# Patient Record
Sex: FEMALE | Race: BLACK OR AFRICAN AMERICAN | NOT HISPANIC OR LATINO | URBAN - METROPOLITAN AREA
[De-identification: names, ages, dates, MRNs, and addresses within clinical notes are randomized per-mention and may not be internally consistent; named-entity substitution may affect disease eponyms.]

---

## 2018-04-23 PROBLEM — 52506002 CHRONIC ULCERATIVE RECTOSIGMOIDITIS: Status: ACTIVE | Noted: 2017-04-13

## 2018-09-25 PROBLEM — 196735001 CSG - CHRONIC SUPERFICIAL GASTRITIS: Status: ACTIVE | Noted: 2018-05-23

## 2018-10-30 PROBLEM — 442076002: Status: ACTIVE | Noted: 2018-10-30

## 2019-05-05 PROBLEM — 444548001: Status: ACTIVE | Noted: 2019-05-05

## 2020-01-07 PROBLEM — 92343006 BENIGN NEOPLASM OF SIGMOID COLON: Status: ACTIVE | Noted: 2019-10-02

## 2020-01-07 PROBLEM — 16331000 HEARTBURN: Status: ACTIVE | Noted: 2019-11-14

## 2020-01-07 PROBLEM — 57676002 JOINT PAIN: Status: ACTIVE | Noted: 2019-11-14

## 2020-01-07 PROBLEM — 274774002: Status: ACTIVE | Noted: 2019-08-14

## 2020-06-09 ENCOUNTER — TELEPHONE ENCOUNTER (OUTPATIENT)
Dept: URBAN - METROPOLITAN AREA CLINIC 35 | Facility: CLINIC | Age: 60
End: 2020-06-09

## 2020-06-10 ENCOUNTER — OFFICE VISIT (OUTPATIENT)
Dept: URBAN - METROPOLITAN AREA CLINIC 35 | Facility: CLINIC | Age: 60
End: 2020-06-10

## 2020-06-10 VITALS — TEMPERATURE: 97.7 F | WEIGHT: 118 LBS | HEIGHT: 65 IN | BODY MASS INDEX: 19.66 KG/M2

## 2020-06-10 RX ORDER — LIOTHYRONINE SODIUM 5 UG/1
TK 1 T PO QD TABLET ORAL
Qty: 30 UNSPECIFIED | Refills: 0 | Status: ACTIVE | COMMUNITY

## 2020-06-10 RX ORDER — MESALAMINE 4 G/60ML
1 SUPPOSITORY SUSPENSION RECTAL DAILY
Qty: 30 | Status: DISCONTINUED | COMMUNITY
Start: 2019-04-18

## 2020-06-10 RX ORDER — METHOTREXATE SODIUM 2.5 MG/1
5 PILLS TABLET ORAL
Status: DISCONTINUED | COMMUNITY
Start: 2019-08-27

## 2020-06-10 RX ORDER — LEVOTHYROXINE SODIUM 75 UG/1
1 CAPSULE CAPSULE ORAL ONCE A DAY
Status: ON HOLD | COMMUNITY

## 2020-06-10 RX ORDER — INFLIXIMAB 100 MG/10ML
5 MG/KG INJECTION, POWDER, LYOPHILIZED, FOR SOLUTION INTRAVENOUS
Status: DISCONTINUED | COMMUNITY
Start: 2019-12-11

## 2020-06-10 RX ORDER — OMEPRAZOLE 20 MG/1
1 CAPSULE 30 MINUTES BEFORE MORNING MEAL CAPSULE, DELAYED RELEASE ORAL ONCE A DAY
Qty: 90 | Refills: 1 | Status: DISCONTINUED | COMMUNITY
Start: 2019-11-06

## 2020-06-10 RX ORDER — FOLIC ACID 1 MG/1
1 TABLET TABLET ORAL ONCE A DAY
Status: DISCONTINUED | COMMUNITY
Start: 2019-08-28

## 2020-06-10 RX ORDER — MESALAMINE 1000 MG/1
1 SUPPOSITORY AT BEDTIME SUPPOSITORY RECTAL ONCE A DAY
Qty: 30 | Refills: 1 | Status: DISCONTINUED | COMMUNITY
Start: 2019-04-18

## 2020-06-10 RX ORDER — CELECOXIB 100 MG/1
1 CAPSULE WITH FOOD CAPSULE ORAL TWICE A DAY
Qty: 60 CAPSULE | Refills: 0 | Status: DISCONTINUED | COMMUNITY
Start: 2019-11-14

## 2020-06-10 RX ORDER — PREDNISONE 10 MG/1
AS DIRECTED TABLET ORAL TWICE A DAY
Qty: 30 | Refills: 1 | Status: DISCONTINUED | COMMUNITY
Start: 2019-08-22

## 2020-06-10 RX ORDER — LEVOTHYROXINE SODIUM 0.07 MG/1
TK 1 T PO QD TABLET ORAL
Qty: 30 UNSPECIFIED | Refills: 5 | Status: ACTIVE | COMMUNITY

## 2020-06-10 RX ORDER — FAMOTIDINE 40 MG/1
1 TABLET AT BEDTIME TABLET, FILM COATED ORAL ONCE A DAY
Qty: 30 | Refills: 2 | Status: DISCONTINUED | COMMUNITY
Start: 2019-11-14

## 2020-06-10 RX ORDER — ONDANSETRON HYDROCHLORIDE 4 MG/1
1 TABLET TABLET, FILM COATED ORAL
Qty: 30 | Refills: 1 | Status: DISCONTINUED | COMMUNITY
Start: 2019-11-06

## 2020-06-10 NOTE — HPI-MIGRATED HPI
;   ;   ;   ;   ;     Bloating/Gas : She deny abnormal episodes of bloating and gas since last visit.   Last visit (2020) Patient state after she weaned off the Prednisone on 10/15/2019 she began to have increase gas and loud bowel sound. Subsequently, she was advised to take Omeprazole 20 mg 1 QD.   She deny bloating/gas since she changed to Methotrexate injections.     Last visit (10/01/2019) Admit less frequent episodes of bloating and gas.   Last visit (2019) She continue to admit increase episodes of gas and bloating throughout the day since she has been on Rowasa.      Last visit (2019) She continue to admit increase episodes of gas throughout the day.    2019 Patient admits increase episodes of gas throughout the day.   2019 Admit increase gas symptoms since she has been taking Lialda.  Admit relief with flatus.;   Unintentional weight loss/weight gain : She deny abnormal weight loss since last visit.  Last visit (2020) She admit loosing 10 lbs over the course of 2 months, which she attribute to decreased appetite due to increased nausea symptoms since she weaned off Prednisone on (10/15/2019).    While inpatient at [facility] from -2019 she was diagnosed with  Severe malnutrition, seen by dietitian. Fiber-restricted.   She report improvement in appetite since she restarted Predisone on 2019.   Today she weigh 122 lbs.  Last visit (10/01/2019) Weight has been stable since last visit.  Today she weigh 126 lbs.  Last visit (2019) Continue to loose weight.  She eat three small gluten free meals daily   Visit (2019) Patient admits unintentional weight loss. She is not weighing herself, as of today per our records there is a 7 pound weight loss since 2019.;   Nausea : Patient admit episodes that occur periodically, which she attribute to increase stress due to the passing of her Mother 5 days ago.  Mother  from complications from Small Cell Lung Cancer.      Last visit (20200 Patient state after she weaned off the Prednisone on 10/15/2019 she began to have increase nausea with feeling "upset stomach", gas and loud bowel sound. she was advised to take Omeprazole 20 mg 1 QD and Zofran TID prn, which she only took the Omeprazole.  She denies symptoms since she has been changed to Methotrexate injection.    Last visit (10/01/2019) Admit nausea that occur a couple time a month.  Last visit (2019) Admit nausea since she has been on the rowasa.    Last visit (2019) She denies any episodes of nausea.  2019 Patient admits intermittent episodes of nausea.   2019 Admit nausea symptoms that occur shortly after taking Lialda and will last for hours then dissipate.  She has not taken medication for symptoms  ;   Ulcerative Colitis : Patient presents today for a follow up for Ulcerative Colitis.   Patient had a Robiotic Total Abdominal Colectomy with End Ileostomy performed by Dr. Arnol Wallace at [facility] on (2020).  Following first post op on (3/3/2020) she began to wean off Prednisone by 5 mg per week.  She was on 20 mg per day at that time and is now completely weaned off.   She is feeling well.  Post-op follow up with TRAY Green at [facility] on (3/3/2020) noting patient appear to be healing appropriately without acute post op complications. Will plan for a robotic prostectomy with j pouch creation and DLI. Patient tony remain on 5 mg of prednisone throughout her surgery.    Patient currently admit changing her colostomy bag 7-9 times per day.  Deneis blood, mucus, or melena in stools.    Last visit (2020) Patient opted for surgery due to refractory Ulcerative colitis.  She will follow up after surgery     Last visit (2020) Patient presents today as a follow up  of ulcerative colitis.  Patient state she weaned off the Prednisone on 10/15/2019.  Since then she has been having increase nausea with feeling "upset stomach", gas and loud bowel sound. Has noticed mild swelling particular on her ring finger and when she take her shoes off.  She is having 2 soft formed BM's per day. Stools are typically really soft and formed or semi-formed and occasionally strenuous.   Currently admit 2 bowel movements per day with blood and mucus present with each evacuation. Admit associated rectal bleeding and lower abdominal cramping that occur prior to and during bowel movement, then will subside.  She increased Prednisone to 2 QAM, 1 QHS 2 days after d/c from [facility] (2019). Last Remicade infusion on (2019), next is scheduled for (2020).  Methotrexate injection every Tuesday.    She d/c the Budesonide on (2019) after admitted to [facility].     Last visit (10/01/2019) She admit marked improvement of symptoms since last visit.  Currently admit 1-2 normal and formed bowel movements per day.  Denies melena, blood or mucus in stools. She continues on the prednisone taper and is currently on prednisone 10 mg po QD   She began treatment with Remicade infusion on (2019)-0, 2 and 6 weeks  (  )   On 2019 we advised patient to start Methotrexate 2.5 mg 5 pills once weekly on an empty stomach, may cause nausea-can eat if not able to tolerate on an empty stomach.  Also need to take Folic Acid 1 mg 1 QD.  Current treatment consist of Remicade Infusion Q 8 weeks, last treatment on (2019) and next will be on 2019.  Methotrexate 2.5 mg 5 pills once every Tuesday on a week on an empty stomach.  She continue to taper the prednisone by 5 mg per week and her last dose will be Nov. 15, 2019.,   She has been well despite her Mother being diagnosed with Small Cell Lung Cancer.  Treated with Chemo and scheduled to have Radiation/Chemo 2019.   Last visit (2019) Patient presents today for a follow up of ulcerative colitis.  She has been treated with Prednisone 40 mg QD from Oct. 30, 2018 through (7/3/2019) and increased to 60 mg of prednisone 2019. Several attempts to taper Prednisone dose since (2019) with no avail, due to persistent flare up of symptoms.   She began treatment with Entyvio 2019 Q 8 weeks.  She was increased to Entyvio Q 6 weeks on (2019), (2019) was the first of the Q 6 week intervals.  Then increased Entyvio Q4 weeks with first 4 week interval on (2019).   She has been tapering the Prednisone 60 mg QD by 5 mg per week and currently at Prednisone 55 mg QD, using Rowasa BID and Turmeric QD.  She has been experiencing rectal irritation internal and external since she has been using the Rowasa.    Patient denies episodes of blood in stool, rectal bleeding or abdominal pain/cramping and her stools are formed since (2019).   Currently admit 1-2 normal and formed bowel movements per day.  Denies melena, blood or mucus in stools.    Last visit (2019)  She completed the use of Rowasa Kit, 4 GM, 1, QD and Canasa Suppository, 1000 MG, 1 QHS.   Her last dose of Entyvio was May 14, 2019 Patient contacted our office on (19) stating she continues to have 2-3 bowel movements a day, stools are runny, and soft.  She continues to take the IB guard.  She is worried that it's taking so long to calm completely down.  She is afraid to leave the house due to random loose stools with bleeding.  She want to know if she need to take a week or so of Prednisone for additional help?  As a result she was advised to stop Entocort and start Prednisone 20 mg po BID, 120-1 . Follow up 2 weeks   She figured out her recent flare up of symptoms was due to te use of Glutamine 1000 mg QD, which she has been taking for several months and d/c 3 weeks ago with resolution of symptoms.    She continue to take Turmeric QD, Prednisone 20 mg po BID and will decrease by 5 mg on Thursday.  Last IV Entyvio 300 MG was on (2019) and next is scheduled for 2019.     Currently admit 1-2 normal and formed bowel movements per day.  Denies melena, blood or mucus in stools.     Last visit (2019) Patient presents today as a follow up of ulcerative colitis and to review recent QDX results.  Patient admits her next dose of Entivyo is May 15, 2019. Her last episode of spotting was last week, she admits 2 bowel movements a day, stool is normal. Patient denies melena, blood, or mucus.   Patient contacted our office on 2019 stating her son was in the hospital x 1.5 weeks causing her to have an "over load of stress". She experienced spots of rectal bleeding within the toilet, abdominal pain and soft stools.  As A result she was prescribed Uceris 9 mg po qd 60 -1.  Patient then contacted our office on 2019 stating she is not getting the relief she thought she would have by week 3 with the budesonide.  She continue to admit a small amount of blood in stools daily and on the tissue with rectal soreness. In addition, she is having 2-3 bowel movements a day, stools are runny, and soft.  She continues to take the IB guard. We advised her to  stop Entocort and start Prednisone 20 mg po BID, 120 and have a QDX completed.  2019 Patient presents today for a follow up of Ulcerative colitis.   She began treatment with Entyvio 2019 and second dose on 2019.  Admit feeling low energy the day following her infusions.  Also has been having joint stiffness in arms legs and sometimes in her fingers since beginning treatment.  Admit 1 bowel movement per day with formed stools. Occasionally will have strenuous defecations.  Of note, patient would like to discuss discontinuing the Lialda, due to continuous side effects of nausea and gas.        Last visit(2019)  Patient admits that she stopped taking Prednisone Tablet, 10 MG 2 weeks ago.   Patient admits continued use of Turmeric Curcumin Capsule, 5-1000 MG, as directed, Orally, Three times a day  Patient admits the Continued use of Lialda Delayed Release Tablet, 1.2, 2 tablets,  daily. She states 1 hour after she takes the medication she becomes nauseated and the symptoms last for 4 hours. Patient admits that she experience excessive gas after taking the medication as well.   1 bowel movement a day with stools normal in form.    18 Patient presents today as a follow up of Ulcerative Colitis. Patient was contacted (2018) with positive C Diff results from her QDx stool study.  Subsequently she was treated with Vancomycin 125 mg 1 QID x 10 days. On (2018) Patient states she completed the  Vancomycin 125 mg, then  Thursday and Friday she began to have a flare up.  Described as having 3 BM's per day with loose watery stools, episodes of rectal bleeding, and mild lower abdominal cramping.  Also having gas, some mucus and pink "flesh-like" debris within her stools.  She began taking 80 mg of Prednisone x 2 days.  She is currently taking Prednisone 20 mg BID.  Currently admit having 1 incomplete defecation since yesterday with narrow stool, but after increasing her water intake she was able to have normal and formed stool today.   Of note, Patient completed the gastric emptying study earlier today. She state she hasn't been able to research biologic's due to flare up of symptoms     Last visit (10/30/2018) Patient presents today for a consult for the evaluation of Ulcerative Colitis. She has previously been evaluated and treated by Dr. Lycnh. However, she presents today for a second opinion.  She states she first started having symptoms in 2017, at the time she was experiencing bloody diarrhea and abdominal pain. She was seen in our office by our physician assistant  2017 for change in bowel habit. A colonoscopy was scheduled for 2017 and revealed Ulcerative (chronic) Pancolitis throughout the rectum and her entire colon.   Patient started Lialda in 2017. However, she discontinued in 2017 and began taking Turmeric and followed an alpha glucosidase diet.   In March patient began to have episodes of dyspepsia. As a result she had an EGD completed by Dr. Lynch on 2018 revealing chronic active gastritis.  On  patient started to experience episodes of early satiety and was evaluated at [facility] ER. During that time her medication regimen included Lialda, Budesonide and a Prednisone taper (which she completed).  Patient admits symptoms have been controlled taking  Budesonide 3 MG PO TID, Lialda Tablet Delayed Release, 1.2 GM, 4 tablets PO QD. However, after having breast augmentation surgery completed 10/01/2018 she started to experience four bloody loose/watery stools per day. As a result she started herself on a Prednisone taper beginning with 60 mg po qd decreasing by 10 mg per week. She is currently on 40 mg po qd and continues to take  Budesonide 3 MG PO TID, Lialda Tablet Delayed Release, 1.2 GM, 4 tablets PO QD. She is currently having one loose/soft bowel movement per day.   Of note, her last colonoscopy was completed 2017 and EGD 2018 both by Dr. Lynch.   2018 Patient presents today for a follow-up office visit from 2018. She is currently taking Lialda 1.2 mg, 3 tablets, daily for UC. She also continues to take Rabeprazole 20 mg daily. Patient states that she completed the course of Prednisone "two weeks ago". Patient states that she has been feeling like her symptoms are coming back. She is currently having diarrhea which occurs first thing in the morning followed by soft stool. She is having "at least" 3 bowel movements per day. Also since stopping the Prednisone, patient states that she is having blood in her stool. The amount of blood varies in quantity, but happens every day. Patient admits to seeing mucous in her stool "a few times a week". She feels as if her stomach is inflamed. Patient states that she is still having stomach cramps. Of note, patient is no longer drinking Ensure. Also of note, patient is having "breast surgery" on Monday.      ;   Hospital Follow Up : Hospitalized (2020) following Robiotic Total Abdominal Colectomy with End Ileostomy performed by Dr. Arnol Wallace at [facility].  Last visit (20200 Patient presented to [facility] ED on (2019)due to feeling dehydrated.  She has a history of ulcerative colitis, on Remicade, Methotrexate SQ.  She has been tapered off chronic prednisone back in mid October.  After that, she started experiencing abdominal pain and increased bowel movement.  She thought initially it was due to oral methotrexate upsetting her stomach, and she was experiencing abdominal pain and increased bowel movements.  She was recently noted to have low potassium by PCP, started on supplements, which exacerbated her symptoms, so she stopped.    She developed rectal bleeding 2 to 3 weeks prior, and was having 4-6 intermittent episodes od diarrhea per day, possibly triggered by stress.   The day before admission, she had 6 bloody bowel movements, so she was started back on oral prednisone on the day of admission morning.  However, she continued to have bloody diarrhea, so she came to the emergency room.  GI was consulted, and patient was admitted to hospitalist service for ulcerative colitis flare. Hemoglobin was monitored closely. Her hemoglobin was stable.  Her hemoglobin on admission was 14.5.  On the day of discharge, it was 11.8, possible hemoconcentration on admission.   Clostridium difficile, Stool cultures and stool for ova and parasites was negative.  Seen by Gastroenterology Dr. Liang Payton.  Received intravenous Solu-Medrol. Hemoglobin stable between 11-12.  Bleeding stable, still had mild traces of blood on wiping; however, hemoglobin was stable.  Labs:  Sodium 142, potassium 3.8, glucose 151, creatinine 0.63.  ALT 12, alkaline phosphatase 80, AST 17, lipase less than 3.  Lactic acid 2.9, trended down to 0.9.  Hemoglobin 11.8, hematocrit 35.8, WBC 6, platelets 227.  Urinalysis negative.  Stool cultures:  Heavy growth of normal enteric raquel.  Stool for WBC positive for lactoferrin.  Lactic acidosis:  Resolved secondary to dehydration.  She was feeling much better.  Abdominal pain resolved.  Diarrhea better.  She was stable for discharge, tolerating diet. Discharged on (2019) to continue Prednisone 40 mg daily until she sees her gastroenterologist on . She also receives Methotrexate SQ every Tuesday and next Remicade infusion due on .   Severe malnutrition, seen by dietitian. Fiber-restricted. Follow up with PCP Dr. Dominga Sutherland in 1 week.  ;

## 2020-06-24 ENCOUNTER — TELEPHONE ENCOUNTER (OUTPATIENT)
Dept: URBAN - METROPOLITAN AREA CLINIC 35 | Facility: CLINIC | Age: 60
End: 2020-06-24

## 2020-09-14 ENCOUNTER — TELEPHONE ENCOUNTER (OUTPATIENT)
Dept: URBAN - METROPOLITAN AREA CLINIC 35 | Facility: CLINIC | Age: 60
End: 2020-09-14

## 2020-09-15 ENCOUNTER — OFFICE VISIT (OUTPATIENT)
Dept: URBAN - METROPOLITAN AREA CLINIC 35 | Facility: CLINIC | Age: 60
End: 2020-09-15

## 2020-09-15 NOTE — HPI-MIGRATED HPI
;   ;   ;   ;   ;     Bloating/Gas : Patient admits/denies any episodes of bloating/gas since her last visit.    Last visit (6/10/2020) She deny abnormal episodes of bloating and gas since last visit.   Last visit (2020) Patient state after she weaned off the Prednisone on 10/15/2019 she began to have increase gas and loud bowel sound. Subsequently, she was advised to take Omeprazole 20 mg 1 QD.   She deny bloating/gas since she changed to Methotrexate injections.     Last visit (10/01/2019) Admit less frequent episodes of bloating and gas.   Last visit (2019) She continue to admit increase episodes of gas and bloating throughout the day since she has been on Rowasa.      Last visit (2019) She continue to admit increase episodes of gas throughout the day.    2019 Patient admits increase episodes of gas throughout the day.   2019 Admit increase gas symptoms since she has been taking Lialda.  Admit relief with flatus.;   Unintentional weight loss/weight gain : She admits/denies any unintentional weight loss/gain since her last visit.   Last visit (6/10/2020) She deny abnormal weight loss since last visit.  Last visit (2020) She admit loosing 10 lbs over the course of 2 months, which she attribute to decreased appetite due to increased nausea symptoms since she weaned off Prednisone on (10/15/2019).    While inpatient at [facility] from -2019 she was diagnosed with  Severe malnutrition, seen by dietitian. Fiber-restricted.   She report improvement in appetite since she restarted Predisone on 2019.   Today she weigh 122 lbs.  Last visit (10/01/2019) Weight has been stable since last visit.  Today she weigh 126 lbs.  Last visit (2019) Continue to loose weight.  She eat three small gluten free meals daily   Visit (2019) Patient admits unintentional weight loss. She is not weighing herself, as of today per our records there is a 7 pound weight loss since 2019.;   Nausea : She admits/denies continued episodes of nausea since her last visit.   Last visit (6/10/2020) Patient admit episodes that occur periodically, which she attribute to increase stress due to the passing of her Mother 5 days ago.  Mother  from complications from Small Cell Lung Cancer.      Last visit (20200 Patient state after she weaned off the Prednisone on 10/15/2019 she began to have increase nausea with feeling "upset stomach", gas and loud bowel sound. she was advised to take Omeprazole 20 mg 1 QD and Zofran TID prn, which she only took the Omeprazole.  She denies symptoms since she has been changed to Methotrexate injection.    Last visit (10/01/2019) Admit nausea that occur a couple time a month.  Last visit (2019) Admit nausea since she has been on the rowasa.    Last visit (2019) She denies any episodes of nausea.  2019 Patient admits intermittent episodes of nausea.   2019 Admit nausea symptoms that occur shortly after taking Lialda and will last for hours then dissipate.  She has not taken medication for symptoms  ;   Ulcerative Colitis : Patient presents today for a follow up of Ulcerative Colitis. She admits/denies any associated symptoms at this time.   Patient reports changing her colostomy bag --- times a day. She admits/denies any blood, mucus, or melena present.    Last visit (6/10/2020) Patient presents today for a follow up for Ulcerative Colitis.   Patient had a Robiotic Total Abdominal Colectomy with End Ileostomy performed by Dr. Arnol Wallace at [facility] on (2020).  Following first post op on (3/3/2020) she began to wean off Prednisone by 5 mg per week.  She was on 20 mg per day at that time and is now completely weaned off.   She is feeling well.  Post-op follow up with TRAY Green at [facility] on (3/3/2020) noting patient appear to be healing appropriately without acute post op complications. Will plan for a robotic prostectomy with j pouch creation and DLI. Patient tony remain on 5 mg of prednisone throughout her surgery.    Patient currently admit changing her colostomy bag 7-9 times per day.  Deneis blood, mucus, or melena in stools.    Last visit (2020) Patient opted for surgery due to refractory Ulcerative colitis.  She will follow up after surgery     Last visit (2020) Patient presents today as a follow up  of ulcerative colitis.  Patient state she weaned off the Prednisone on 10/15/2019.  Since then she has been having increase nausea with feeling "upset stomach", gas and loud bowel sound. Has noticed mild swelling particular on her ring finger and when she take her shoes off.  She is having 2 soft formed BM's per day. Stools are typically really soft and formed or semi-formed and occasionally strenuous.   Currently admit 2 bowel movements per day with blood and mucus present with each evacuation. Admit associated rectal bleeding and lower abdominal cramping that occur prior to and during bowel movement, then will subside.  She increased Prednisone to 2 QAM, 1 QHS 2 days after d/c from [facility] (2019). Last Remicade infusion on (2019), next is scheduled for (2020).  Methotrexate injection every Tuesday.    She d/c the Budesonide on (2019) after admitted to [facility].     Last visit (10/01/2019) She admit marked improvement of symptoms since last visit.  Currently admit 1-2 normal and formed bowel movements per day.  Denies melena, blood or mucus in stools. She continues on the prednisone taper and is currently on prednisone 10 mg po QD   She began treatment with Remicade infusion on (2019)-0, 2 and 6 weeks  (  )   On 2019 we advised patient to start Methotrexate 2.5 mg 5 pills once weekly on an empty stomach, may cause nausea-can eat if not able to tolerate on an empty stomach.  Also need to take Folic Acid 1 mg 1 QD.  Current treatment consist of Remicade Infusion Q 8 weeks, last treatment on (2019) and next will be on 2019.  Methotrexate 2.5 mg 5 pills once every Tuesday on a week on an empty stomach.  She continue to taper the prednisone by 5 mg per week and her last dose will be Nov. 15, 2019.,   She has been well despite her Mother being diagnosed with Small Cell Lung Cancer.  Treated with Chemo and scheduled to have Radiation/Chemo 2019.   Last visit (2019) Patient presents today for a follow up of ulcerative colitis.  She has been treated with Prednisone 40 mg QD from Oct. 30, 2018 through (7/3/2019) and increased to 60 mg of prednisone 2019. Several attempts to taper Prednisone dose since (2019) with no avail, due to persistent flare up of symptoms.   She began treatment with Entyvio 2019 Q 8 weeks.  She was increased to Entyvio Q 6 weeks on (2019), (2019) was the first of the Q 6 week intervals.  Then increased Entyvio Q4 weeks with first 4 week interval on (2019).   She has been tapering the Prednisone 60 mg QD by 5 mg per week and currently at Prednisone 55 mg QD, using Rowasa BID and Turmeric QD.  She has been experiencing rectal irritation internal and external since she has been using the Rowasa.    Patient denies episodes of blood in stool, rectal bleeding or abdominal pain/cramping and her stools are formed since (2019).   Currently admit 1-2 normal and formed bowel movements per day.  Denies melena, blood or mucus in stools.    Last visit (2019)  She completed the use of Rowasa Kit, 4 GM, 1, QD and Canasa Suppository, 1000 MG, 1 QHS.   Her last dose of Entyvio was May 14, 2019 Patient contacted our office on (19) stating she continues to have 2-3 bowel movements a day, stools are runny, and soft.  She continues to take the IB guard.  She is worried that it's taking so long to calm completely down.  She is afraid to leave the house due to random loose stools with bleeding.  She want to know if she need to take a week or so of Prednisone for additional help?  As a result she was advised to stop Entocort and start Prednisone 20 mg po BID, 120-1 . Follow up 2 weeks   She figured out her recent flare up of symptoms was due to te use of Glutamine 1000 mg QD, which she has been taking for several months and d/c 3 weeks ago with resolution of symptoms.    She continue to take Turmeric QD, Prednisone 20 mg po BID and will decrease by 5 mg on Thursday.  Last IV Entyvio 300 MG was on (2019) and next is scheduled for 2019.     Currently admit 1-2 normal and formed bowel movements per day.  Denies melena, blood or mucus in stools.     Last visit (2019) Patient presents today as a follow up of ulcerative colitis and to review recent QDX results.  Patient admits her next dose of Entivyo is May 15, 2019. Her last episode of spotting was last week, she admits 2 bowel movements a day, stool is normal. Patient denies melena, blood, or mucus.   Patient contacted our office on 2019 stating her son was in the hospital x 1.5 weeks causing her to have an "over load of stress". She experienced spots of rectal bleeding within the toilet, abdominal pain and soft stools.  As A result she was prescribed Uceris 9 mg po qd 60 -1.  Patient then contacted our office on 2019 stating she is not getting the relief she thought she would have by week 3 with the budesonide.  She continue to admit a small amount of blood in stools daily and on the tissue with rectal soreness. In addition, she is having 2-3 bowel movements a day, stools are runny, and soft.  She continues to take the IB guard. We advised her to  stop Entocort and start Prednisone 20 mg po BID, 120 and have a QDX completed.  2019 Patient presents today for a follow up of Ulcerative colitis.   She began treatment with Entyvio 2019 and second dose on 2019.  Admit feeling low energy the day following her infusions.  Also has been having joint stiffness in arms legs and sometimes in her fingers since beginning treatment.  Admit 1 bowel movement per day with formed stools. Occasionally will have strenuous defecations.  Of note, patient would like to discuss discontinuing the Lialda, due to continuous side effects of nausea and gas.        Last visit(2019)  Patient admits that she stopped taking Prednisone Tablet, 10 MG 2 weeks ago.   Patient admits continued use of Turmeric Curcumin Capsule, 5-1000 MG, as directed, Orally, Three times a day  Patient admits the Continued use of Lialda Delayed Release Tablet, 1.2, 2 tablets,  daily. She states 1 hour after she takes the medication she becomes nauseated and the symptoms last for 4 hours. Patient admits that she experience excessive gas after taking the medication as well.   1 bowel movement a day with stools normal in form.    18 Patient presents today as a follow up of Ulcerative Colitis. Patient was contacted (2018) with positive C Diff results from her QDx stool study.  Subsequently she was treated with Vancomycin 125 mg 1 QID x 10 days. On (2018) Patient states she completed the  Vancomycin 125 mg, then  Thursday and Friday she began to have a flare up.  Described as having 3 BM's per day with loose watery stools, episodes of rectal bleeding, and mild lower abdominal cramping.  Also having gas, some mucus and pink "flesh-like" debris within her stools.  She began taking 80 mg of Prednisone x 2 days.  She is currently taking Prednisone 20 mg BID.  Currently admit having 1 incomplete defecation since yesterday with narrow stool, but after increasing her water intake she was able to have normal and formed stool today.   Of note, Patient completed the gastric emptying study earlier today. She state she hasn't been able to research biologic's due to flare up of symptoms     Last visit (10/30/2018) Patient presents today for a consult for the evaluation of Ulcerative Colitis. She has previously been evaluated and treated by Dr. Lynch. However, she presents today for a second opinion.  She states she first started having symptoms in 2017, at the time she was experiencing bloody diarrhea and abdominal pain. She was seen in our office by our physician assistant  2017 for change in bowel habit. A colonoscopy was scheduled for 2017 and revealed Ulcerative (chronic) Pancolitis throughout the rectum and her entire colon.   Patient started Lialda in 2017. However, she discontinued in 2017 and began taking Turmeric and followed an alpha glucosidase diet.   In March patient began to have episodes of dyspepsia. As a result she had an EGD completed by Dr. Lynch on 2018 revealing chronic active gastritis.  On  patient started to experience episodes of early satiety and was evaluated at [facility] ER. During that time her medication regimen included Lialda, Budesonide and a Prednisone taper (which she completed).  Patient admits symptoms have been controlled taking  Budesonide 3 MG PO TID, Lialda Tablet Delayed Release, 1.2 GM, 4 tablets PO QD. However, after having breast augmentation surgery completed 10/01/2018 she started to experience four bloody loose/watery stools per day. As a result she started herself on a Prednisone taper beginning with 60 mg po qd decreasing by 10 mg per week. She is currently on 40 mg po qd and continues to take  Budesonide 3 MG PO TID, Lialda Tablet Delayed Release, 1.2 GM, 4 tablets PO QD. She is currently having one loose/soft bowel movement per day.   Of note, her last colonoscopy was completed 2017 and EGD 2018 both by Dr. Lynch.   2018 Patient presents today for a follow-up office visit from 2018. She is currently taking Lialda 1.2 mg, 3 tablets, daily for UC. She also continues to take Rabeprazole 20 mg daily. Patient states that she completed the course of Prednisone "two weeks ago". Patient states that she has been feeling like her symptoms are coming back. She is currently having diarrhea which occurs first thing in the morning followed by soft stool. She is having "at least" 3 bowel movements per day. Also since stopping the Prednisone, patient states that she is having blood in her stool. The amount of blood varies in quantity, but happens every day. Patient admits to seeing mucous in her stool "a few times a week". She feels as if her stomach is inflamed. Patient states that she is still having stomach cramps. Of note, patient is no longer drinking Ensure. Also of note, patient is having "breast surgery" on Monday.      ;   Hospital Follow Up : Hospitalized (2020) following Robiotic Total Abdominal Colectomy with End Ileostomy performed by Dr. Arnol Wallace at [facility].  Last visit (20200 Patient presented to [facility] ED on (2019)due to feeling dehydrated.  She has a history of ulcerative colitis, on Remicade, Methotrexate SQ.  She has been tapered off chronic prednisone back in mid October.  After that, she started experiencing abdominal pain and increased bowel movement.  She thought initially it was due to oral methotrexate upsetting her stomach, and she was experiencing abdominal pain and increased bowel movements.  She was recently noted to have low potassium by PCP, started on supplements, which exacerbated her symptoms, so she stopped.    She developed rectal bleeding 2 to 3 weeks prior, and was having 4-6 intermittent episodes od diarrhea per day, possibly triggered by stress.   The day before admission, she had 6 bloody bowel movements, so she was started back on oral prednisone on the day of admission morning.  However, she continued to have bloody diarrhea, so she came to the emergency room.  GI was consulted, and patient was admitted to hospitalist service for ulcerative colitis flare. Hemoglobin was monitored closely. Her hemoglobin was stable.  Her hemoglobin on admission was 14.5.  On the day of discharge, it was 11.8, possible hemoconcentration on admission.   Clostridium difficile, Stool cultures and stool for ova and parasites was negative.  Seen by Gastroenterology Dr. Liang Payton.  Received intravenous Solu-Medrol. Hemoglobin stable between 11-12.  Bleeding stable, still had mild traces of blood on wiping; however, hemoglobin was stable.  Labs:  Sodium 142, potassium 3.8, glucose 151, creatinine 0.63.  ALT 12, alkaline phosphatase 80, AST 17, lipase less than 3.  Lactic acid 2.9, trended down to 0.9.  Hemoglobin 11.8, hematocrit 35.8, WBC 6, platelets 227.  Urinalysis negative.  Stool cultures:  Heavy growth of normal enteric raquel.  Stool for WBC positive for lactoferrin.  Lactic acidosis:  Resolved secondary to dehydration.  She was feeling much better.  Abdominal pain resolved.  Diarrhea better.  She was stable for discharge, tolerating diet. Discharged on (2019) to continue Prednisone 40 mg daily until she sees her gastroenterologist on . She also receives Methotrexate SQ every Tuesday and next Remicade infusion due on .   Severe malnutrition, seen by dietitian. Fiber-restricted. Follow up with PCP Dr. Dominga Sutherland in 1 week.  ;

## 2021-09-28 ENCOUNTER — OFFICE VISIT (OUTPATIENT)
Dept: URBAN - METROPOLITAN AREA CLINIC 35 | Facility: CLINIC | Age: 61
End: 2021-09-28

## 2021-09-28 VITALS
SYSTOLIC BLOOD PRESSURE: 108 MMHG | HEART RATE: 87 BPM | BODY MASS INDEX: 18.33 KG/M2 | DIASTOLIC BLOOD PRESSURE: 62 MMHG | WEIGHT: 110 LBS | OXYGEN SATURATION: 100 % | HEIGHT: 65 IN

## 2021-09-28 PROBLEM — 14760008 CONSTIPATION: Status: ACTIVE | Noted: 2021-09-28

## 2021-09-28 RX ORDER — ACETAMINOPHEN 500 MG
AS DIRECTED TABLET ORAL
Status: ACTIVE | COMMUNITY

## 2021-09-28 RX ORDER — LEVOTHYROXINE SODIUM 0.07 MG/1
TK 1 T PO QD TABLET ORAL
Qty: 30 UNSPECIFIED | Refills: 5 | Status: DISCONTINUED | COMMUNITY

## 2021-09-28 RX ORDER — PEPPERMINT OIL 90 MG
AS DIRECTED CAPSULE, DELAYED, AND EXTENDED RELEASE ORAL
Status: ACTIVE | COMMUNITY

## 2021-09-28 RX ORDER — IBUPROFEN 800 MG
AS DIRECTED TABLET ORAL
Status: ACTIVE | COMMUNITY

## 2021-09-28 RX ORDER — LIOTHYRONINE SODIUM 5 UG/1
TK 1 T PO QD TABLET ORAL
Qty: 30 UNSPECIFIED | Refills: 0 | Status: DISCONTINUED | COMMUNITY

## 2021-09-28 RX ORDER — LEVOTHYROXINE SODIUM 75 UG/1
1 CAPSULE CAPSULE ORAL ONCE A DAY
Status: ACTIVE | COMMUNITY

## 2021-09-28 NOTE — EXAM-MIGRATED EXAMINATIONS
ABDOMEN: - bowel sounds present, no masses palpable, no organomegaly , no rebound tenderness, soft, nontender, nondistended. Midline scar present ;

## 2021-09-28 NOTE — HPI-MIGRATED HPI
;   ;   ;   ;   ;   ;     Bloating/Gas : Patient denies any episodes of bloating/gas.    Last visit (6/10/2020) She deny abnormal episodes of bloating and gas since last visit.   Last visit (2020) Patient state after she weaned off the Prednisone on 10/15/2019 she began to have increase gas and loud bowel sound. Subsequently, she was advised to take Omeprazole 20 mg 1 QD.   She deny bloating/gas since she changed to Methotrexate injections.     Last visit (10/01/2019) Admit less frequent episodes of bloating and gas.   Last visit (2019) She continue to admit increase episodes of gas and bloating throughout the day since she has been on Rowasa.      Last visit (2019) She continue to admit increase episodes of gas throughout the day.    2019 Patient admits increase episodes of gas throughout the day.   2019 Admit increase gas symptoms since she has been taking Lialda.  Admit relief with flatus.;   Unintentional weight loss/weight gain : She admit weighing 102 lbs  following colostomy reversal  (2020). Weight is now stable.  Last visit (6/10/2020) She deny abnormal weight loss since last visit.  Last visit (2020) She admit loosing 10 lbs over the course of 2 months, which she attribute to decreased appetite due to increased nausea symptoms since she weaned off Prednisone on (10/15/2019).    While inpatient at [facility] from -2019 she was diagnosed with  Severe malnutrition, seen by dietitian. Fiber-restricted.   She report improvement in appetite since she restarted Predisone on 2019.   Today she weigh 122 lbs.  Last visit (10/01/2019) Weight has been stable since last visit.  Today she weigh 126 lbs.  Last visit (2019) Continue to loose weight.  She eat three small gluten free meals daily   Visit (2019) Patient admits unintentional weight loss. She is not weighing herself, as of today per our records there is a 7 pound weight loss since 2019.;   Rectal Bleeding : Admits occasional mild spots of blood on tissue and a "stinging" sensation following severe episodes of strenuous evacuations.     She has been using a Homeopathic HemCalm oint following her evening meal with relief.;   Nausea : She denies continued episodes of nausea since her last visit.   Last visit (6/10/2020) Patient admit episodes that occur periodically, which she attribute to increase stress due to the passing of her Mother 5 days ago.  Mother  from complications from Small Cell Lung Cancer.      Last visit (20200 Patient state after she weaned off the Prednisone on 10/15/2019 she began to have increase nausea with feeling "upset stomach", gas and loud bowel sound. she was advised to take Omeprazole 20 mg 1 QD and Zofran TID prn, which she only took the Omeprazole.  She denies symptoms since she has been changed to Methotrexate injection.    Last visit (10/01/2019) Admit nausea that occur a couple time a month.  Last visit (2019) Admit nausea since she has been on the rowasa.    Last visit (2019) She denies any episodes of nausea.  2019 Patient admits intermittent episodes of nausea.   2019 Admit nausea symptoms that occur shortly after taking Lialda and will last for hours then dissipate.  She has not taken medication for symptoms;   Ulcerative Colitis : Patient presents today for a follow up for her history of Ulcerative Colitis.   Per telephone encounter on (2020) patient had her colostomy bag removed on 2021. She reports she was admitted to [facility] on (9/15/2020) due to complications from colostomy reversal with Dr. Arnol Wallace.  Since d/c from [place] she report slow recovery.  Currently reports varying bowel habits.  Desribed as having 5-6 incomplete  and srtrenuous evacuations per day.  Stools range from thick and pile-like to loose and watery.  Denies melena, blood or mucus in stools.   Admits occasional mild spots of blood on tissue and a "stinging" sensation following severe episodes of strenuous evacuations.      Last visit (6/10/2020) Patient presents today for a follow up for Ulcerative Colitis.   Patient had a Robiotic Total Abdominal Colectomy with End Ileostomy performed by Dr. Arnol Wallace at [facility] on (2020).  Following first post op on (3/3/2020) she began to wean off Prednisone by 5 mg per week.  She was on 20 mg per day at that time and is now completely weaned off.   She is feeling well.  Post-op follow up with TRAY Green at [facility] on (3/3/2020) noting patient appear to be healing appropriately without acute post op complications. Will plan for a robotic prostectomy with j pouch creation and DLI. Patient tony remain on 5 mg of prednisone throughout her surgery.    Patient currently admit changing her colostomy bag 7-9 times per day.  Deneis blood, mucus, or melena in stools.    Last visit (2020) Patient opted for surgery due to refractory Ulcerative colitis.  She will follow up after surgery     Last visit (2020) Patient presents today as a follow up  of ulcerative colitis.  Patient state she weaned off the Prednisone on 10/15/2019.  Since then she has been having increase nausea with feeling "upset stomach", gas and loud bowel sound. Has noticed mild swelling particular on her ring finger and when she take her shoes off.  She is having 2 soft formed BM's per day. Stools are typically really soft and formed or semi-formed and occasionally strenuous.   Currently admit 2 bowel movements per day with blood and mucus present with each evacuation. Admit associated rectal bleeding and lower abdominal cramping that occur prior to and during bowel movement, then will subside.  She increased Prednisone to 2 QAM, 1 QHS 2 days after d/c from [facility] (2019). Last Remicade infusion on (2019), next is scheduled for (2020).  Methotrexate injection every Tuesday.    She d/c the Budesonide on (2019) after admitted to [facility].     Last visit (10/01/2019) She admit marked improvement of symptoms since last visit.  Currently admit 1-2 normal and formed bowel movements per day.  Denies melena, blood or mucus in stools. She continues on the prednisone taper and is currently on prednisone 10 mg po QD   She began treatment with Remicade infusion on (2019)-0, 2 and 6 weeks  (  )   On 2019 we advised patient to start Methotrexate 2.5 mg 5 pills once weekly on an empty stomach, may cause nausea-can eat if not able to tolerate on an empty stomach.  Also need to take Folic Acid 1 mg 1 QD.  Current treatment consist of Remicade Infusion Q 8 weeks, last treatment on (2019) and next will be on 2019.  Methotrexate 2.5 mg 5 pills once every Tuesday on a week on an empty stomach.  She continue to taper the prednisone by 5 mg per week and her last dose will be Nov. 15, 2019.,   She has been well despite her Mother being diagnosed with Small Cell Lung Cancer.  Treated with Chemo and scheduled to have Radiation/Chemo 2019.   Last visit (2019) Patient presents today for a follow up of ulcerative colitis.  She has been treated with Prednisone 40 mg QD from Oct. 30, 2018 through (7/3/2019) and increased to 60 mg of prednisone 2019. Several attempts to taper Prednisone dose since (2019) with no avail, due to persistent flare up of symptoms.   She began treatment with Entyvio 2019 Q 8 weeks.  She was increased to Entyvio Q 6 weeks on (2019), (2019) was the first of the Q 6 week intervals.  Then increased Entyvio Q4 weeks with first 4 week interval on (2019).   She has been tapering the Prednisone 60 mg QD by 5 mg per week and currently at Prednisone 55 mg QD, using Rowasa BID and Turmeric QD.  She has been experiencing rectal irritation internal and external since she has been using the Rowasa.    Patient denies episodes of blood in stool, rectal bleeding or abdominal pain/cramping and her stools are formed since (2019).   Currently admit 1-2 normal and formed bowel movements per day.  Denies melena, blood or mucus in stools.    Last visit (2019)  She completed the use of Rowasa Kit, 4 GM, 1, QD and Canasa Suppository, 1000 MG, 1 QHS.   Her last dose of Entyvio was May 14, 2019 Patient contacted our office on (19) stating she continues to have 2-3 bowel movements a day, stools are runny, and soft.  She continues to take the IB guard.  She is worried that it's taking so long to calm completely down.  She is afraid to leave the house due to random loose stools with bleeding.  She want to know if she need to take a week or so of Prednisone for additional help?  As a result she was advised to stop Entocort and start Prednisone 20 mg po BID, 120-1 . Follow up 2 weeks   She figured out her recent flare up of symptoms was due to te use of Glutamine 1000 mg QD, which she has been taking for several months and d/c 3 weeks ago with resolution of symptoms.    She continue to take Turmeric QD, Prednisone 20 mg po BID and will decrease by 5 mg on Thursday.  Last IV Entyvio 300 MG was on (2019) and next is scheduled for 2019.     Currently admit 1-2 normal and formed bowel movements per day.  Denies melena, blood or mucus in stools.     Last visit (2019) Patient presents today as a follow up of ulcerative colitis and to review recent QDX results.  Patient admits her next dose of Entivyo is May 15, 2019. Her last episode of spotting was last week, she admits 2 bowel movements a day, stool is normal. Patient denies melena, blood, or mucus.   Patient contacted our office on 2019 stating her son was in the hospital x 1.5 weeks causing her to have an "over load of stress". She experienced spots of rectal bleeding within the toilet, abdominal pain and soft stools.  As A result she was prescribed Uceris 9 mg po qd 60 -1.  Patient then contacted our office on 2019 stating she is not getting the relief she thought she would have by week 3 with the budesonide.  She continue to admit a small amount of blood in stools daily and on the tissue with rectal soreness. In addition, she is having 2-3 bowel movements a day, stools are runny, and soft.  She continues to take the IB guard. We advised her to  stop Entocort and start Prednisone 20 mg po BID, 120 and have a QDX completed.  2019 Patient presents today for a follow up of Ulcerative colitis.   She began treatment with Entyvio 2019 and second dose on 2019.  Admit feeling low energy the day following her infusions.  Also has been having joint stiffness in arms legs and sometimes in her fingers since beginning treatment.  Admit 1 bowel movement per day with formed stools. Occasionally will have strenuous defecations.  Of note, patient would like to discuss discontinuing the Lialda, due to continuous side effects of nausea and gas.        Last visit(2019)  Patient admits that she stopped taking Prednisone Tablet, 10 MG 2 weeks ago.   Patient admits continued use of Turmeric Curcumin Capsule, 5-1000 MG, as directed, Orally, Three times a day  Patient admits the Continued use of Lialda Delayed Release Tablet, 1.2, 2 tablets,  daily. She states 1 hour after she takes the medication she becomes nauseated and the symptoms last for 4 hours. Patient admits that she experience excessive gas after taking the medication as well.   1 bowel movement a day with stools normal in form.    18 Patient presents today as a follow up of Ulcerative Colitis. Patient was contacted (2018) with positive C Diff results from her QDx stool study.  Subsequently she was treated with Vancomycin 125 mg 1 QID x 10 days. On (2018) Patient states she completed the  Vancomycin 125 mg, then  Thursday and Friday she began to have a flare up.  Described as having 3 BM's per day with loose watery stools, episodes of rectal bleeding, and mild lower abdominal cramping.  Also having gas, some mucus and pink "flesh-like" debris within her stools.  She began taking 80 mg of Prednisone x 2 days.  She is currently taking Prednisone 20 mg BID.  Currently admit having 1 incomplete defecation since yesterday with narrow stool, but after increasing her water intake she was able to have normal and formed stool today.   Of note, Patient completed the gastric emptying study earlier today. She state she hasn't been able to research biologic's due to flare up of symptoms     Last visit (10/30/2018) Patient presents today for a consult for the evaluation of Ulcerative Colitis. She has previously been evaluated and treated by Dr. Lynch. However, she presents today for a second opinion.  She states she first started having symptoms in 2017, at the time she was experiencing bloody diarrhea and abdominal pain. She was seen in our office by our physician assistant  2017 for change in bowel habit. A colonoscopy was scheduled for 2017 and revealed Ulcerative (chronic) Pancolitis throughout the rectum and her entire colon.   Patient started Lialda in 2017. However, she discontinued in 2017 and began taking Turmeric and followed an alpha glucosidase diet.   In March patient began to have episodes of dyspepsia. As a result she had an EGD completed by Dr. Lynch on 2018 revealing chronic active gastritis.  On  patient started to experience episodes of early satiety and was evaluated at [facility] ER. During that time her medication regimen included Lialda, Budesonide and a Prednisone taper (which she completed).  Patient admits symptoms have been controlled taking  Budesonide 3 MG PO TID, Lialda Tablet Delayed Release, 1.2 GM, 4 tablets PO QD. However, after having breast augmentation surgery completed 10/01/2018 she started to experience four bloody loose/watery stools per day. As a result she started herself on a Prednisone taper beginning with 60 mg po qd decreasing by 10 mg per week. She is currently on 40 mg po qd and continues to take  Budesonide 3 MG PO TID, Lialda Tablet Delayed Release, 1.2 GM, 4 tablets PO QD. She is currently having one loose/soft bowel movement per day.   Of note, her last colonoscopy was completed 2017 and EGD 2018 both by Dr. Lynch.   2018 Patient presents today for a follow-up office visit from 2018. She is currently taking Lialda 1.2 mg, 3 tablets, daily for UC. She also continues to take Rabeprazole 20 mg daily. Patient states that she completed the course of Prednisone "two weeks ago". Patient states that she has been feeling like her symptoms are coming back. She is currently having diarrhea which occurs first thing in the morning followed by soft stool. She is having "at least" 3 bowel movements per day. Also since stopping the Prednisone, patient states that she is having blood in her stool. The amount of blood varies in quantity, but happens every day. Patient admits to seeing mucous in her stool "a few times a week". She feels as if her stomach is inflamed. Patient states that she is still having stomach cramps. Of note, patient is no longer drinking Ensure. Also of note, patient is having "breast surgery" on Monday.;   Hospital Follow Up : Hospitalized at [facility] (9/15/2020) due to comlications s/p colostomy reversal performed on (2020)  Hospitalized (2020) following Robiotic Total Abdominal Colectomy with End Ileostomy performed by Dr. Arnol Wallace at [facility].  Last visit (20200 Patient presented to [facility] ED on (2019)due to feeling dehydrated.  She has a history of ulcerative colitis, on Remicade, Methotrexate SQ.  She has been tapered off chronic prednisone back in mid October.  After that, she started experiencing abdominal pain and increased bowel movement.  She thought initially it was due to oral methotrexate upsetting her stomach, and she was experiencing abdominal pain and increased bowel movements.  She was recently noted to have low potassium by PCP, started on supplements, which exacerbated her symptoms, so she stopped.    She developed rectal bleeding 2 to 3 weeks prior, and was having 4-6 intermittent episodes od diarrhea per day, possibly triggered by stress.   The day before admission, she had 6 bloody bowel movements, so she was started back on oral prednisone on the day of admission morning.  However, she continued to have bloody diarrhea, so she came to the emergency room.  GI was consulted, and patient was admitted to hospitalist service for ulcerative colitis flare. Hemoglobin was monitored closely. Her hemoglobin was stable.  Her hemoglobin on admission was 14.5.  On the day of discharge, it was 11.8, possible hemoconcentration on admission.   Clostridium difficile, Stool cultures and stool for ova and parasites was negative.  Seen by Gastroenterology Dr. Liang Payton.  Received intravenous Solu-Medrol. Hemoglobin stable between 11-12.  Bleeding stable, still had mild traces of blood on wiping; however, hemoglobin was stable.  Labs:  Sodium 142, potassium 3.8, glucose 151, creatinine 0.63.  ALT 12, alkaline phosphatase 80, AST 17, lipase less than 3.  Lactic acid 2.9, trended down to 0.9.  Hemoglobin 11.8, hematocrit 35.8, WBC 6, platelets 227.  Urinalysis negative.  Stool cultures:  Heavy growth of normal enteric raquel.  Stool for WBC positive for lactoferrin.  Lactic acidosis:  Resolved secondary to dehydration.  She was feeling much better.  Abdominal pain resolved.  Diarrhea better.  She was stable for discharge, tolerating diet. Discharged on (2019) to continue Prednisone 40 mg daily until she sees her gastroenterologist on . She also receives Methotrexate SQ every Tuesday and next Remicade infusion due on .   Severe malnutrition, seen by dietitian. Fiber-restricted. Follow up with PCP Dr. Dominga Sutherland in 1 week.;

## 2021-09-29 ENCOUNTER — TELEPHONE ENCOUNTER (OUTPATIENT)
Dept: URBAN - METROPOLITAN AREA CLINIC 35 | Facility: CLINIC | Age: 61
End: 2021-09-29

## 2022-01-11 ENCOUNTER — OFFICE VISIT (OUTPATIENT)
Dept: URBAN - METROPOLITAN AREA CLINIC 35 | Facility: CLINIC | Age: 62
End: 2022-01-11
Payer: COMMERCIAL

## 2022-01-11 VITALS
DIASTOLIC BLOOD PRESSURE: 60 MMHG | OXYGEN SATURATION: 90 % | HEIGHT: 65 IN | SYSTOLIC BLOOD PRESSURE: 100 MMHG | BODY MASS INDEX: 17.83 KG/M2 | WEIGHT: 107 LBS | HEART RATE: 111 BPM

## 2022-01-11 DIAGNOSIS — K51.011 ULCERATIVE (CHRONIC) PANCOLITIS WITH RECTAL BLEEDING: ICD-10-CM

## 2022-01-11 DIAGNOSIS — R19.4 CHANGE IN BOWEL HABITS: ICD-10-CM

## 2022-01-11 DIAGNOSIS — K62.89 OTHER SPECIFIED DISEASES OF ANUS AND RECTUM: ICD-10-CM

## 2022-01-11 DIAGNOSIS — K31.84 GASTROPARESIS: ICD-10-CM

## 2022-01-11 PROBLEM — 426867001 ANORECTAL DISORDER: Status: ACTIVE | Noted: 2021-09-28

## 2022-01-11 PROBLEM — 129851009: Status: ACTIVE | Noted: 2021-09-28

## 2022-01-11 PROCEDURE — 99213 OFFICE O/P EST LOW 20 MIN: CPT | Performed by: INTERNAL MEDICINE

## 2022-01-11 RX ORDER — LEVOTHYROXINE SODIUM 50 UG/1
1 CAPSULE CAPSULE ORAL ONCE A DAY
Status: ACTIVE | COMMUNITY

## 2022-01-11 RX ORDER — ACETAMINOPHEN 500 MG
AS DIRECTED TABLET ORAL
Status: ACTIVE | COMMUNITY

## 2022-01-11 RX ORDER — PEPPERMINT OIL 90 MG
AS DIRECTED CAPSULE, DELAYED, AND EXTENDED RELEASE ORAL
Status: ACTIVE | COMMUNITY

## 2022-01-11 RX ORDER — IBUPROFEN 800 MG
AS DIRECTED TABLET ORAL
Status: ACTIVE | COMMUNITY

## 2022-01-11 NOTE — PHYSICAL EXAM GASTROINTESTINAL
Abdomen , soft, nontender, nondistended , no guarding or rigidity , no masses palpable , normal bowel sounds , Liver and Spleen , no hepatomegaly present , no hepatosplenomegaly , liver nontender , spleen not palpable. Scar present

## 2022-01-11 NOTE — HPI-RECTAL BLEEDING
Denies any rectal bleeding at this time.    Last visit (9/28/2021) Admits occasional mild spots of blood on tissue and a "stinging" sensation following severe episodes of strenuous evacuations.         She has been using a Homeopathic HemCalm oint following her evening meal with relief.

## 2022-01-11 NOTE — HPI-ULCERATIVE COLITIS
Patient presents today for a follow up for her history of Ulcerative Colitis.  Currently reports varying bowel habits. She admits the continued use of IBGard 1 PO BID to help with bowel movements. Patient described bowel habits as having 5-7 incomplete and strenuous evacuations per day.  Stools range from thick and pile-like to loose and watery. Denies melena, blood or mucus in stools.   Last visit (9/28/2021)   Patient presents today for a follow up for her history of Ulcerative Colitis.         Per telephone encounter on (9/14/2020) patient had her colostomy bag removed on 9/11/2021. She reports she was admitted to [facility] on (9/15/2020) due to complications from colostomy reversal with Dr. Arnol Wallace. Since d/c from [place] she report slow recovery.        Currently reports varying bowel habits. Desribed as having 5-6 incomplete and srtrenuous evacuations per day. Stools range from thick and pile-like to loose and watery. Denies melena, blood or mucus in stools.         Admits occasional mild spots of blood on tissue and a "stinging" sensation following severe episodes of strenuous evacuations.         Last visit (6/10/2020)        Patient presents today for a follow up for Ulcerative Colitis. Patient had a Robiotic Total Abdominal Colectomy with End Ileostomy performed by Dr. Arnol Wallace at [facility] on (2/17/2020). Following first post op on (3/3/2020) she began to wean off Prednisone by 5 mg per week. She was on 20 mg per day at that time and is now completely weaned off. She is feeling well.        Post-op follow up with TRAY Green at [facility] on (3/3/2020) noting patient appear to be healing appropriately without acute post op complications. Will plan for a robotic prostectomy with j pouch creation and DLI. Patient tony remain on 5 mg of prednisone throughout her surgery.        Patient currently admit changing her colostomy bag 7-9 times per day. Deneis blood, mucus, or melena in stools.        Last visit (2/11/2020) Patient opted for surgery due to refractory Ulcerative colitis. She will follow up after surgery         Last visit (01/07/2020)        Patient presents today as a follow up of ulcerative colitis. Patient state she weaned off the Prednisone on 10/15/2019. Since then she has been having increase nausea with feeling "upset stomach", gas and loud bowel sound. Has noticed mild swelling particular on her ring finger and when she take her shoes off. She is having 2 soft formed BM's per day. Stools are typically really soft and formed or semi-formed and occasionally strenuous.        Currently admit 2 bowel movements per day with blood and mucus present with each evacuation. Admit associated rectal bleeding and lower abdominal cramping that occur prior to and during bowel movement, then will subside.        She increased Prednisone to 2 QAM, 1 QHS 2 days after d/c from [facility] (12/27/2019). Last Remicade infusion on (12/20/2019), next is scheduled for (1/19/2020). Methotrexate injection every Tuesday.         She d/c the Budesonide on (12/24/2019) [facility]

## 2022-07-12 ENCOUNTER — OFFICE VISIT (OUTPATIENT)
Dept: URBAN - METROPOLITAN AREA CLINIC 35 | Facility: CLINIC | Age: 62
End: 2022-07-12
Payer: COMMERCIAL

## 2022-07-12 VITALS
WEIGHT: 108 LBS | BODY MASS INDEX: 17.99 KG/M2 | HEIGHT: 65 IN | DIASTOLIC BLOOD PRESSURE: 64 MMHG | SYSTOLIC BLOOD PRESSURE: 108 MMHG

## 2022-07-12 DIAGNOSIS — K62.89 OTHER SPECIFIED DISEASES OF ANUS AND RECTUM: ICD-10-CM

## 2022-07-12 DIAGNOSIS — K31.84 GASTROPARESIS: ICD-10-CM

## 2022-07-12 DIAGNOSIS — K51.011 ULCERATIVE (CHRONIC) PANCOLITIS WITH RECTAL BLEEDING: ICD-10-CM

## 2022-07-12 DIAGNOSIS — R19.4 CHANGE IN BOWEL HABITS: ICD-10-CM

## 2022-07-12 PROCEDURE — 99213 OFFICE O/P EST LOW 20 MIN: CPT | Performed by: INTERNAL MEDICINE

## 2022-07-12 RX ORDER — ACETAMINOPHEN 500 MG
AS DIRECTED TABLET ORAL
Status: ACTIVE | COMMUNITY

## 2022-07-12 RX ORDER — PEPPERMINT OIL 90 MG
AS DIRECTED CAPSULE, DELAYED, AND EXTENDED RELEASE ORAL
Status: ACTIVE | COMMUNITY

## 2022-07-12 RX ORDER — LEVOTHYROXINE SODIUM 50 UG/1
1 CAPSULE CAPSULE ORAL ONCE A DAY
Status: ACTIVE | COMMUNITY

## 2022-07-12 RX ORDER — IBUPROFEN 800 MG
AS DIRECTED TABLET ORAL
Status: ACTIVE | COMMUNITY

## 2022-07-12 NOTE — HPI-RECTAL BLEEDING
Deny rectal bleeding episodes.  Last visit (1/11/2022) Denies any rectal bleeding at this time.    Last visit (9/28/2021) Admits occasional mild spots of blood on tissue and a "stinging" sensation following severe episodes of strenuous evacuations.         She has been using a Homeopathic HemCalm oint following her evening meal with relief.

## 2022-07-12 NOTE — HPI-HOSPITAL FOLLOW-UP
Denies hospitalization or ER visit since last visit.   Last visit (1/11/2022) Denies hospitalization or ER visit since last visit.   Last visit (9/28/2021) Hospitalized at [facility] (9/15/2020) due to comlications s/p colostomy reversal performed on (9/11/2020)        Hospitalized (2/17/2020) following Robiotic Total Abdominal Colectomy with End Ileostomy performed by Dr. Arnol Wallace at [facility].        Last visit (01/07/20200        [facility]due to feeling dehydrated. She has a history of ulcerative colitis, on Remicade, Methotrexate SQ. She has been tapered off chronic prednisone back in mid October. After that, she started experiencing abdominal pain and increased bowel movement. She thought initially it was due to oral methotrexate upsetting her stomach, and she was experiencing abdominal pain and increased bowel movements. She was recently noted to have low potassium by PCP, started on supplements, which exacerbated her symptoms, so she stopped.         She developed rectal bleeding 2 to 3 weeks prior, and was having        4-6 intermittent episodes od diarrhea per day, possibly triggered by stress. The day before admission, she had 6 bloody bowel movements, so she was started back on oral prednisone on the day of admission morning. However, she continued to have bloody diarrhea, so she came to the        emergency room. GI was consulted, and patient was admitted to hospitalist        service for ulcerative colitis flare. Hemoglobin was monitored closely.        Her hemoglobin was stable. Her hemoglobin on admission was 14.5. On the day        of discharge, it was 11.8, possible hemoconcentration on admission.        Clostridium difficile, Stool cultures and stool for ova and parasites was negative. Seen by Gastroenterology Dr. Liang Payton. Received intravenous Solu-Medrol. Hemoglobin stable between 11-12. Bleeding stable, still had mild traces of blood on wiping; however, hemoglobin was stable.        Labs: Sodium 142, potassium 3.8, glucose 151, creatinine 0.63. ALT 12, alkaline phosphatase 80, AST 17, lipase less than 3. Lactic acid 2.9, trended down to 0.9. Hemoglobin 11.8, hematocrit 35.8, WBC 6, platelets 227.        Urinalysis negative.        Stool cultures: Heavy growth of normal enteric raquel.        Stool for WBC positive for lactoferrin.        Lactic acidosis: Resolved secondary to dehydration.        She was feeling much better. Abdominal pain resolved. Diarrhea better. She was stable for discharge, tolerating diet. Discharged on (12/27/2019) to continue Prednisone 40 mg daily until she sees her gastroenterologist on 01/07. She also receives Methotrexate SQ every Tuesday and next Remicade infusion due on 01/19.         Severe malnutrition, seen by dietitian. Fiber-restricted. Follow up with PCP Dr. Dominga Sutherland in 1 week.

## 2022-07-12 NOTE — HPI-ULCERATIVE COLITIS
Patient presents today for a 6 month follow up for her history of Ulcerative (chronic) pancolitis with rectal bleeding.   She was able to adhere to the low FODMAP diet and does not tolerate dairy.   Currently reports varying bowel habits. She admits the continued use of IBGard 1 QD to help with bowel movements. Patient described bowel habits as having 5-7 incomplete evacuations per day.  Stools range from thick and pile-like. She has had to position change, leaning foward, back or side to side to get the bowels to move. She will sit up to an hour in order to pass all the stools,  Denies melena, blood or mucus in stools.  She drink a [employer] Protein drink daily,  She is drinking Alkaline water.  Last visit (1/11/2022) Patient presents today for a follow up for her history of Ulcerative Colitis.  Currently reports varying bowel habits. She admits the continued use of IBGard 1 PO BID to help with bowel movements. Patient described bowel habits as having 5-7 incomplete and strenuous evacuations per day.  Stools range from thick and pile-like to loose and watery. Denies melena, blood or mucus in stools.   Last visit (9/28/2021)   Patient presents today for a follow up for her history of Ulcerative Colitis.         Per telephone encounter on (9/14/2020) patient had her colostomy bag removed on 9/11/2021. She reports she was admitted to [facility] on (9/15/2020) due to complications from colostomy reversal with Dr. Arnol Wallace. Since d/c from [place] she report slow recovery.        Currently reports varying bowel habits. Desribed as having 5-6 incomplete and srtrenuous evacuations per day. Stools range from thick and pile-like to loose and watery. Denies melena, blood or mucus in stools.         Admits occasional mild spots of blood on tissue and a "stinging" sensation following severe episodes of strenuous evacuations.

## 2022-07-12 NOTE — HPI-GASTROPARESIS
Admit dietary modifications with improvement.  Avoiding raw vegetables, consuming a low fat low fiber.  Admit early satiety at times. She will have upset stomach if she eat more than usual.

## 2022-07-14 ENCOUNTER — OFFICE VISIT (OUTPATIENT)
Dept: URBAN - METROPOLITAN AREA TELEHEALTH 2 | Facility: TELEHEALTH | Age: 62
End: 2022-07-14
Payer: COMMERCIAL

## 2022-07-14 DIAGNOSIS — K51.80 CHRONIC PANCOLONIC ULCERATIVE COLITIS: ICD-10-CM

## 2022-07-14 PROCEDURE — 97802 MEDICAL NUTRITION INDIV IN: CPT | Performed by: DIETITIAN, REGISTERED

## 2022-07-14 RX ORDER — LEVOTHYROXINE SODIUM 50 UG/1
1 CAPSULE CAPSULE ORAL ONCE A DAY
Status: ACTIVE | COMMUNITY

## 2022-07-14 RX ORDER — IBUPROFEN 800 MG
AS DIRECTED TABLET ORAL
Status: ACTIVE | COMMUNITY

## 2022-07-14 RX ORDER — ACETAMINOPHEN 500 MG
AS DIRECTED TABLET ORAL
Status: ACTIVE | COMMUNITY

## 2022-07-14 RX ORDER — PEPPERMINT OIL 90 MG
AS DIRECTED CAPSULE, DELAYED, AND EXTENDED RELEASE ORAL
Status: ACTIVE | COMMUNITY

## 2022-12-19 NOTE — HPI-HOSPITAL FOLLOW-UP
Problem: Occupational Therapy  Goal: Occupational Therapy Goal  Description: Goals to be met by: 12/15/22     Patient will increase functional independence with ADLs by performing:    Feeding with Supervision.  UE Dressing with Moderate Assistance.  LE Dressing with Moderate Assistance.  Grooming while seated with Minimal Assistance.  Toilet transfer to bedside commode with Minimal Assistance.  Upper extremity exercise program x10 reps per handout, with assistance as needed.    Outcome: Ongoing, Progressing      Denies hospitalization or ER visit since last visit.   Last visit (9/28/2021) Hospitalized at [facility] (9/15/2020) due to comlications s/p colostomy reversal performed on (9/11/2020)        Hospitalized (2/17/2020) following Robiotic Total Abdominal Colectomy with End Ileostomy performed by Dr. Arnol Wallace at [facility].        Last visit (01/07/20200        Patient presented to [facility] ED on (12/24/2019)due to feeling dehydrated. She has a history of ulcerative colitis, on Remicade, Methotrexate SQ. She has been tapered off chronic prednisone back in mid October. After that, she started experiencing abdominal pain and increased bowel movement. She thought initially it was due to oral methotrexate upsetting her stomach, and she was experiencing abdominal pain and increased bowel movements. She was recently noted to have low potassium by PCP, started on supplements, which exacerbated her symptoms, so she stopped.         She developed rectal bleeding 2 to 3 weeks prior, and was having        4-6 intermittent episodes od diarrhea per day, possibly triggered by stress. The day before admission, she had 6 bloody bowel movements, so she was started back on oral prednisone on the day of admission morning. However, she continued to have bloody diarrhea, so she came to the        emergency room. GI was consulted, and patient was admitted to hospitalist        service for ulcerative colitis flare. Hemoglobin was monitored closely.        Her hemoglobin was stable. Her hemoglobin on admission was 14.5. On the day        of discharge, it was 11.8, possible hemoconcentration on admission.        Clostridium difficile, Stool cultures and stool for ova and parasites was negative. Seen by Gastroenterology Dr. Liang Payton. Received intravenous Solu-Medrol. Hemoglobin stable between 11-12. Bleeding stable, still had mild traces of blood on wiping; however, hemoglobin was stable.        Labs: Sodium 142, potassium 3.8, glucose 151, creatinine 0.63. ALT 12, alkaline phosphatase 80, AST 17, lipase less than 3. Lactic acid 2.9, trended down to 0.9. Hemoglobin 11.8, hematocrit 35.8, WBC 6, platelets 227.        Urinalysis negative.        Stool cultures: Heavy growth of normal enteric raquel.        Stool for WBC positive for lactoferrin.        Lactic acidosis: Resolved secondary to dehydration.        She was feeling much better. Abdominal pain resolved. Diarrhea better. She was stable for discharge, tolerating diet. Discharged on (12/27/2019) to continue Prednisone 40 mg daily until she sees her gastroenterologist on 01/07. She also receives Methotrexate SQ every Tuesday and next Remicade infusion due on 01/19.         Severe malnutrition, seen by dietitian. Fiber-restricted. Follow up with PCP Dr. Dominga Sutherland in 1 week.

## 2023-01-10 ENCOUNTER — OFFICE VISIT (OUTPATIENT)
Dept: URBAN - METROPOLITAN AREA CLINIC 35 | Facility: CLINIC | Age: 63
End: 2023-01-10

## 2023-01-31 ENCOUNTER — CLAIMS CREATED FROM THE CLAIM WINDOW (OUTPATIENT)
Dept: URBAN - METROPOLITAN AREA CLINIC 35 | Facility: CLINIC | Age: 63
End: 2023-01-31
Payer: COMMERCIAL

## 2023-01-31 VITALS
SYSTOLIC BLOOD PRESSURE: 116 MMHG | WEIGHT: 112.8 LBS | OXYGEN SATURATION: 99 % | DIASTOLIC BLOOD PRESSURE: 58 MMHG | BODY MASS INDEX: 18.8 KG/M2 | HEIGHT: 65 IN | HEART RATE: 94 BPM

## 2023-01-31 DIAGNOSIS — K51.011 ULCERATIVE (CHRONIC) PANCOLITIS WITH RECTAL BLEEDING: ICD-10-CM

## 2023-01-31 DIAGNOSIS — K31.84 GASTROPARESIS: ICD-10-CM

## 2023-01-31 PROBLEM — 235675006: Status: ACTIVE | Noted: 2022-01-11

## 2023-01-31 PROBLEM — 442159003 CHRONIC ULCERATIVE PANCOLITIS: Status: ACTIVE | Noted: 2018-05-23

## 2023-01-31 PROCEDURE — 99214 OFFICE O/P EST MOD 30 MIN: CPT | Performed by: INTERNAL MEDICINE

## 2023-01-31 RX ORDER — ACETAMINOPHEN 500 MG
AS DIRECTED TABLET ORAL
Status: ACTIVE | COMMUNITY

## 2023-01-31 RX ORDER — IBUPROFEN 800 MG
AS DIRECTED TABLET ORAL
Status: ACTIVE | COMMUNITY

## 2023-01-31 RX ORDER — PEPPERMINT OIL 90 MG
AS DIRECTED CAPSULE, DELAYED, AND EXTENDED RELEASE ORAL
Status: ACTIVE | COMMUNITY

## 2023-01-31 RX ORDER — LEVOTHYROXINE SODIUM 50 UG/1
1 CAPSULE CAPSULE ORAL ONCE A DAY
Status: ACTIVE | COMMUNITY

## 2023-01-31 NOTE — HPI-ULCERATIVE COLITIS
Patient presents today for a 6 month follow up of ulcerative colitis. She denies any recent  flare up . Patient currently denies any change in bowel habits or appetite, difficulty emptying bowels, fecal urgency, fecal incontinence, abdominal pain/cramping, nausea, fever or fatigue.   She currently reports 6 bowel movements per a day depending on what she drinks, without strain. Her stools are loose without the presence of blood, mucus, and melena. She denies any pruritus ani or rectal pain.   Patient has a J pouch Most recent labs were performed 07/2022.    (Last Visit 07.12.2022) Patient presents today for a 6 month follow up for her history of Ulcerative (chronic) pancolitis with rectal bleeding.   She was able to adhere to the low FODMAP diet and does not tolerate dairy.   Currently reports varying bowel habits. She admits the continued use of IBGard 1 QD to help with bowel movements. Patient described bowel habits as having 5-7 incomplete evacuations per day.  Stools range from thick and pile-like. She has had to position change, leaning foward, back or side to side to get the bowels to move. She will sit up to an hour in order to pass all the stools,  Denies melena, blood or mucus in stools.  She denies drink a Core Power Protein drink daily,  She is drinking Alkaline water. Patient also state she had her colon removed(Dr. Wallace)  Last visit (1/11/2022) Patient presents today for a follow up for her history of Ulcerative Colitis.  Currently reports varying bowel habits. She admits the continued use of IBGard 1 PO BID to help with bowel movements. Patient described bowel habits as having 5-7 incomplete and strenuous evacuations per day.  Stools range from thick and pile-like to loose and watery. Denies melena, blood or mucus in stools.   Last visit (9/28/2021)   Patient presents today for a follow up for her history of Ulcerative Colitis.         Per telephone encounter on (9/14/2020) patient had her colostomy bag removed on 9/11/2021. She reports she was admitted to [facility] on (9/15/2020) due to complications from colostomy reversal with Dr. Arnol Wallace. Since d/c from [place] she report slow recovery.        Currently reports varying bowel habits. Desribed as having 5-6 incomplete and srtrenuous evacuations per day. Stools range from thick and pile-like to loose and watery. Denies melena, blood or mucus in stools.         Admits occasional mild spots of blood on tissue and a "stinging" sensation following severe episodes of strenuous evacuations.

## 2023-01-31 NOTE — HPI-GASTROPARESIS
Patient continues to avoid raw vegeatbles. She some what continues to adhere the low fat/fiber diet.  She has started acupuncture   (Last Visit 07.12.2022) Admit dietary modifications with improvement.  Avoiding raw vegetables, consuming a low fat low fiber.  Admit early satiety at times. She will have upset stomach if she eat more than usual. 20-Jul-2017

## 2023-01-31 NOTE — HPI-RECTAL BLEEDING
Conitnues to deny any recent episodes of rectal bleeding.   (Last Visit 07.12.2022) Deny rectal bleeding episodes.  Last visit (1/11/2022) Denies any rectal bleeding at this time.    Last visit (9/28/2021) Admits occasional mild spots of blood on tissue and a "stinging" sensation following severe episodes of strenuous evacuations.         She has been using a Homeopathic HemCalm oint following her evening meal with relief.

## 2023-01-31 NOTE — HPI-HOSPITAL FOLLOW-UP
Patient denies any recent ER or hositalizations.  (Last Visit 07.12.2022) Denies hospitalization or ER visit since last visit.   Last visit (1/11/2022) Denies hospitalization or ER visit since last visit.   Last visit (9/28/2021) Hospitalized at [facility] (9/15/2020) due to comlications s/p colostomy reversal performed on (9/11/2020)        Hospitalized (2/17/2020) following Robiotic Total Abdominal Colectomy with End Ileostomy performed by Dr. Arnol Wallace at [facility].        Last visit (01/07/20200        Patient presented to [facility] ED on (12/24/2019)due to feeling dehydrated. She has a history of ulcerative colitis, on Remicade, Methotrexate SQ. She has been tapered off chronic prednisone back in mid October. After that, she started experiencing abdominal pain and increased bowel movement. She thought initially it was due to oral methotrexate upsetting her stomach, and she was experiencing abdominal pain and increased bowel movements. She was recently noted to have low potassium by PCP, started on supplements, which exacerbated her symptoms, so she stopped.         She developed rectal bleeding 2 to 3 weeks prior, and was having        4-6 intermittent episodes od diarrhea per day, possibly triggered by stress. The day before admission, she had 6 bloody bowel movements, so she was started back on oral prednisone on the day of admission morning. However, she continued to have bloody diarrhea, so she came to the        emergency room. GI was consulted, and patient was admitted to hospitalist        service for ulcerative colitis flare. Hemoglobin was monitored closely.        Her hemoglobin was stable. Her hemoglobin on admission was 14.5. On the day        of discharge, it was 11.8, possible hemoconcentration on admission.        Clostridium difficile, Stool cultures and stool for ova and parasites was negative. Seen by Gastroenterology Dr. Liang Payton. Received intravenous Solu-Medrol. Hemoglobin stable between 11-12. Bleeding stable, still had mild traces of blood on wiping; however, hemoglobin was stable.        Labs: Sodium 142, potassium 3.8, glucose 151, creatinine 0.63. ALT 12, alkaline phosphatase 80, AST 17, lipase less than 3. Lactic acid 2.9, trended down to 0.9. Hemoglobin 11.8, hematocrit 35.8, WBC 6, platelets 227.        Urinalysis negative.        Stool cultures: Heavy growth of normal enteric raquel.        Stool for WBC positive for lactoferrin.        Lactic acidosis: Resolved secondary to dehydration.        She was feeling much better. Abdominal pain resolved. Diarrhea better. She was stable for discharge, tolerating diet. Discharged on (12/27/2019) to continue Prednisone 40 mg daily until she sees her gastroenterologist on 01/07. She also receives Methotrexate SQ every Tuesday and next Remicade infusion due on 01/19.         Severe malnutrition, seen by dietitian. Fiber-restricted. Follow up with PCP Dr. Dominga Sutherland in 1 week.

## 2023-04-27 ENCOUNTER — TELEPHONE ENCOUNTER (OUTPATIENT)
Dept: URBAN - METROPOLITAN AREA CLINIC 35 | Facility: CLINIC | Age: 63
End: 2023-04-27

## 2024-01-30 ENCOUNTER — DASHBOARD ENCOUNTERS (OUTPATIENT)
Age: 64
End: 2024-01-30

## 2024-01-30 ENCOUNTER — OFFICE VISIT (OUTPATIENT)
Dept: URBAN - METROPOLITAN AREA CLINIC 35 | Facility: CLINIC | Age: 64
End: 2024-01-30
Payer: COMMERCIAL

## 2024-01-30 VITALS
BODY MASS INDEX: 18.63 KG/M2 | OXYGEN SATURATION: 99 % | WEIGHT: 111.8 LBS | DIASTOLIC BLOOD PRESSURE: 70 MMHG | SYSTOLIC BLOOD PRESSURE: 110 MMHG | HEIGHT: 65 IN | HEART RATE: 72 BPM

## 2024-01-30 DIAGNOSIS — K51.011 ULCERATIVE (CHRONIC) PANCOLITIS WITH RECTAL BLEEDING: ICD-10-CM

## 2024-01-30 DIAGNOSIS — K31.84 GASTROPARESIS: ICD-10-CM

## 2024-01-30 PROCEDURE — 99213 OFFICE O/P EST LOW 20 MIN: CPT | Performed by: INTERNAL MEDICINE

## 2024-01-30 RX ORDER — ACETAMINOPHEN 500 MG
AS DIRECTED TABLET ORAL
Status: ACTIVE | COMMUNITY

## 2024-01-30 RX ORDER — LEVOTHYROXINE SODIUM 50 UG/1
1 CAPSULE CAPSULE ORAL ONCE A DAY
Status: ACTIVE | COMMUNITY

## 2024-01-30 RX ORDER — IBUPROFEN 800 MG
AS DIRECTED TABLET ORAL
Status: ACTIVE | COMMUNITY

## 2024-01-30 RX ORDER — PEPPERMINT OIL 90 MG
AS DIRECTED CAPSULE, DELAYED, AND EXTENDED RELEASE ORAL
Status: ACTIVE | COMMUNITY

## 2024-01-30 NOTE — HPI-GASTROPARESIS
She denies any symptoms of gastroparesis at this time. Patient states she continues to avoid raw vegetables and adhere a low fiber/low fat diet with/out improve symptoms.   She has been trying [employer] nutritional formula ( vegan ) - standard  (Last Visit 01.31.2023) Patient continues to avoid raw vegeatbles. She some what continues to adhere the low fat/fiber diet.  She has started acupuncture   (Last Visit 07.12.2022) Admit dietary modifications with improvement.  Avoiding raw vegetables, consuming a low fat low fiber.  Admit early satiety at times. She will have upset stomach if she eat more than usual.

## 2024-01-30 NOTE — HPI-ULCERATIVE COLITIS
Patient present today for a yearly follow-up of ulcerative colitis. She admits/denies any recent flare up. Patient  admits/denies any recent symptoms of change in bowel habits or appetite, difficulty emptying bowels, fecal urgency, fecal incontinence, abdominal pain/cramping, nausea, fever or fatigue. Bowel varies between 7-8, but are not full bowel movement. Stools varies between loose/normal without the presence of blood, mucus, and melena. She denies any pruritus ani or rectal pain.   Most recent labs were performed 03.31.2023 revealed Glucose: 138H, T3,Total: 57L, Vitamin B12:>2000H, all other labs were normal.   (Last Visit 01.31.2023) Patient presents today for a 6 month follow up of ulcerative colitis. She denies any recent  flare up . Patient currently denies any change in bowel habits or appetite, difficulty emptying bowels, fecal urgency, fecal incontinence, abdominal pain/cramping, nausea, fever or fatigue.   She currently reports 6 bowel movements per a day depending on what she drinks, without strain. Her stools are loose without the presence of blood, mucus, and melena. She denies any pruritus ani or rectal pain.   Patient has a J pouch Most recent labs were performed 07/2022.    (Last Visit 07.12.2022) Patient presents today for a 6 month follow up for her history of Ulcerative (chronic) pancolitis with rectal bleeding.   She was able to adhere to the low FODMAP diet and does not tolerate dairy.   Currently reports varying bowel habits. She admits the continued use of IBGard 1 QD to help with bowel movements. Patient described bowel habits as having 5-7 incomplete evacuations per day.  Stools range from thick and pile-like. She has had to position change, leaning foward, back or side to side to get the bowels to move. She will sit up to an hour in order to pass all the stools,  Denies melena, blood or mucus in stools.  She denies drink a Core Power Protein drink daily,  She is drinking Alkaline water. Patient also state she had her colon removed(Dr. Wallace)  Last visit (1/11/2022) Patient presents today for a follow up for her history of Ulcerative Colitis.  Currently reports varying bowel habits. She admits the continued use of IBGard 1 PO BID to help with bowel movements. Patient described bowel habits as having 5-7 incomplete and strenuous evacuations per day.  Stools range from thick and pile-like to loose and watery. Denies melena, blood or mucus in stools.

## 2025-02-04 ENCOUNTER — OFFICE VISIT (OUTPATIENT)
Dept: URBAN - METROPOLITAN AREA CLINIC 35 | Facility: CLINIC | Age: 65
End: 2025-02-04
Payer: COMMERCIAL

## 2025-02-04 VITALS
OXYGEN SATURATION: 100 % | BODY MASS INDEX: 19.33 KG/M2 | HEIGHT: 65 IN | HEART RATE: 80 BPM | DIASTOLIC BLOOD PRESSURE: 60 MMHG | WEIGHT: 116 LBS | SYSTOLIC BLOOD PRESSURE: 112 MMHG

## 2025-02-04 DIAGNOSIS — K51.011 ULCERATIVE (CHRONIC) PANCOLITIS WITH RECTAL BLEEDING: ICD-10-CM

## 2025-02-04 DIAGNOSIS — K31.84 GASTROPARESIS: ICD-10-CM

## 2025-02-04 DIAGNOSIS — F43.21 GRIEF REACTION: ICD-10-CM

## 2025-02-04 PROBLEM — 224965009: Status: ACTIVE | Noted: 2025-02-04

## 2025-02-04 PROCEDURE — 99213 OFFICE O/P EST LOW 20 MIN: CPT | Performed by: INTERNAL MEDICINE

## 2025-02-04 RX ORDER — IBUPROFEN 800 MG
AS DIRECTED TABLET ORAL
Status: ACTIVE | COMMUNITY

## 2025-02-04 RX ORDER — LEVOTHYROXINE SODIUM 50 UG/1
1 CAPSULE CAPSULE ORAL ONCE A DAY
Status: ACTIVE | COMMUNITY

## 2025-02-04 RX ORDER — PEPPERMINT OIL 90 MG
AS DIRECTED CAPSULE, DELAYED, AND EXTENDED RELEASE ORAL
Status: ACTIVE | COMMUNITY

## 2025-02-04 RX ORDER — ACETAMINOPHEN 500 MG
AS DIRECTED TABLET ORAL
Status: ACTIVE | COMMUNITY

## 2025-02-04 NOTE — HPI-ULCERATIVE COLITIS
Patient present today for a yearly follow-up of ulcerative colitis. She denies any recent flare up. Patient  denies any recent symptoms of change in bowel habits or appetite, difficulty emptying bowels, fecal urgency, fecal incontinence, abdominal pain/cramping, nausea, fever or fatigue.  Stools varies between loose/normal without the presence of blood, mucus, and melena. She denies any pruritus ani or rectal pain.  Patient she is adjusting to her new normal with the J-pouch.  She admits 6-7 bowel movement per day.   Most recent labs completed 08.27.2024 PCP Dominga Sutherland. Lipid Panel All within normal limits except Choleterol 271H, LDL-Cholesterol 113H,CMP All within normal limits, TSH,T4,T4 Free,T3,Free All within normal limits. T3 65L, CBC All within normal limits.Ion and Total Iron, Ferritin All within normal limits.  Vitamin B12 >2000H    (Last Visit 01/30/2024) Patient present today for a yearly follow-up of ulcerative colitis. She admits/denies any recent flare up. Patient  admits/denies any recent symptoms of change in bowel habits or appetite, difficulty emptying bowels, fecal urgency, fecal incontinence, abdominal pain/cramping, nausea, fever or fatigue. Bowel varies between 7-8, but are not full bowel movement. Stools varies between loose/normal without the presence of blood, mucus, and melena. She denies any pruritus ani or rectal pain.   Most recent labs were performed 03.31.2023 revealed Glucose: 138H, T3,Total: 57L, Vitamin B12:>2000H, all other labs were normal.   (Last Visit 01.31.2023) Patient presents today for a 6 month follow up of ulcerative colitis. She denies any recent  flare up . Patient currently denies any change in bowel habits or appetite, difficulty emptying bowels, fecal urgency, fecal incontinence, abdominal pain/cramping, nausea, fever or fatigue.   She currently reports 6 bowel movements per a day depending on what she drinks, without strain. Her stools are loose without the presence of blood, mucus, and melena. She denies any pruritus ani or rectal pain.   Patient has a J pouch Most recent labs were performed 07/2022.    (Last Visit 07.12.2022) Patient presents today for a 6 month follow up for her history of Ulcerative (chronic) pancolitis with rectal bleeding.   She was able to adhere to the low FODMAP diet and does not tolerate dairy.   Currently reports varying bowel habits. She admits the continued use of IBGard 1 QD to help with bowel movements. Patient described bowel habits as having 5-7 incomplete evacuations per day.  Stools range from thick and pile-like. She has had to position change, leaning foward, back or side to side to get the bowels to move. She will sit up to an hour in order to pass all the stools,  Denies melena, blood or mucus in stools.  She denies drink a Core Power Protein drink daily,  She is drinking Alkaline water. Patient also state she had her colon removed(Dr. Wallace)  Last visit (1/11/2022) Patient presents today for a follow up for her history of Ulcerative Colitis.  Currently reports varying bowel habits. She admits the continued use of IBGard 1 PO BID to help with bowel movements. Patient described bowel habits as having 5-7 incomplete and strenuous evacuations per day.  Stools range from thick and pile-like to loose and watery. Denies melena, blood or mucus in stools.

## 2025-02-04 NOTE — HPI-GASTROPARESIS
She denies any symptoms of gastroparesis at this time. Patient states she continues to avoid raw vegetables and adhere a low fiber/low fat diet with/out improve symptoms. Patient states she is seeking medical attention from acupuncturist.   (Last Visit 01/30/2024) She denies any symptoms of gastroparesis at this time. Patient states she continues to avoid raw vegetables and adhere a low fiber/low fat diet with/out improve symptoms.   She has been trying "[employer] nutritional formula (vegan) - standard  (Last Visit 01.31.2023) Patient continues to avoid raw vegetables. She somewhat continues to adhere the low fat/fiber diet.  She has started acupuncture   (Last Visit 07.12.2022) Admit dietary modifications with improvement.  Avoiding raw vegetables, consuming a low fat low fiber.  Admit early satiety at times. She will have upset stomach if she eats more than usual.